# Patient Record
Sex: FEMALE | Race: ASIAN | NOT HISPANIC OR LATINO | Employment: FULL TIME | URBAN - METROPOLITAN AREA
[De-identification: names, ages, dates, MRNs, and addresses within clinical notes are randomized per-mention and may not be internally consistent; named-entity substitution may affect disease eponyms.]

---

## 2018-12-27 ENCOUNTER — HOSPITAL ENCOUNTER (OUTPATIENT)
Dept: RADIOLOGY | Facility: MEDICAL CENTER | Age: 48
End: 2018-12-27
Attending: EMERGENCY MEDICINE
Payer: COMMERCIAL

## 2018-12-27 ENCOUNTER — OFFICE VISIT (OUTPATIENT)
Dept: URGENT CARE | Facility: MEDICAL CENTER | Age: 48
End: 2018-12-27
Payer: COMMERCIAL

## 2018-12-27 VITALS
TEMPERATURE: 98.8 F | HEART RATE: 99 BPM | WEIGHT: 112.2 LBS | HEIGHT: 64 IN | OXYGEN SATURATION: 95 % | DIASTOLIC BLOOD PRESSURE: 82 MMHG | SYSTOLIC BLOOD PRESSURE: 118 MMHG | BODY MASS INDEX: 19.15 KG/M2

## 2018-12-27 DIAGNOSIS — S63.259A DISLOCATION OF FINGER, INITIAL ENCOUNTER: ICD-10-CM

## 2018-12-27 PROCEDURE — 26700 TREAT KNUCKLE DISLOCATION: CPT | Performed by: EMERGENCY MEDICINE

## 2018-12-27 PROCEDURE — 73140 X-RAY EXAM OF FINGER(S): CPT | Mod: LT

## 2018-12-27 RX ORDER — ESTROGEN,CON/M-PROGEST ACET 0.3-1.5MG
TABLET ORAL
COMMUNITY
Start: 2018-12-16

## 2018-12-30 ASSESSMENT — ENCOUNTER SYMPTOMS
FOCAL WEAKNESS: 1
FEVER: 0
DIARRHEA: 0
VOMITING: 0
NAUSEA: 0
FALLS: 0
NERVOUS/ANXIOUS: 1
CHILLS: 0

## 2018-12-30 NOTE — PROGRESS NOTES
"Subjective:      Annelise Vila is a 48 y.o. female who presents with Hand Injury (left pinky/ painful/ cant move/ swollen )            HPI  Pt jammed left 5th finger earlier today and has closed deformity of the left DIP joint with inability to bend   A non work related injury.  PMH:  has no past medical history on file.  MEDS:   Current Outpatient Prescriptions:   •  PREMPRO 0.3-1.5 MG per tablet, , Disp: , Rfl:   ALLERGIES: Not on File  SURGHX: History reviewed. No pertinent surgical history.  SOCHX:  reports that she has never smoked. She has never used smokeless tobacco. She reports that she does not drink alcohol or use drugs.  FH: Reviewed with patient, not pertinent to this visit.   Review of Systems   Constitutional: Negative for chills and fever.   Gastrointestinal: Negative for diarrhea, nausea and vomiting.   Musculoskeletal: Positive for joint pain. Negative for falls.   Skin: Negative for rash.   Neurological: Positive for focal weakness.   Psychiatric/Behavioral: The patient is nervous/anxious.           Objective:     /82   Pulse 99   Temp 37.1 °C (98.8 °F) (Temporal)   Ht 1.626 m (5' 4\")   Wt 50.9 kg (112 lb 3.2 oz)   LMP  (LMP Unknown)   SpO2 95%   BMI 19.26 kg/m²      Physical Exam   Constitutional: She appears well-developed and well-nourished. No distress.   Musculoskeletal: She exhibits edema, tenderness and deformity.   Exam of left 5th finger shows dorsal dislocation deformity of left 5th DIP joint. Normal NV exam, unable to flex.   Neurological: She is alert. Coordination normal.   Skin: Skin is warm and dry.   Psychiatric: She has a normal mood and affect. Her behavior is normal.   Nursing note and vitals reviewed.            Finger xray dorsal dislocation of the left 5th finger DIP jt dorsally     Procedure I offered to block the finger, which she declined prior to reduction, which was done with gentle distraction, pt then able to flex and extend. Pt refused post " reduction films was kale taped in physiologic flexion.  Assessment/Plan:     1. Dislocation of finger, initial encounter    - DX-FINGER(S) 2+ LEFT; Future    Pt will keep kale taped for the next 7-10 days and then gradually in [crese ROM to flexion and extension.